# Patient Record
Sex: MALE | Race: OTHER | HISPANIC OR LATINO | URBAN - METROPOLITAN AREA
[De-identification: names, ages, dates, MRNs, and addresses within clinical notes are randomized per-mention and may not be internally consistent; named-entity substitution may affect disease eponyms.]

---

## 2024-11-07 ENCOUNTER — EMERGENCY (EMERGENCY)
Facility: HOSPITAL | Age: 24
LOS: 0 days | Discharge: HOME | End: 2024-11-07
Attending: EMERGENCY MEDICINE
Payer: SELF-PAY

## 2024-11-07 VITALS
TEMPERATURE: 99 F | DIASTOLIC BLOOD PRESSURE: 92 MMHG | OXYGEN SATURATION: 100 % | WEIGHT: 128.31 LBS | RESPIRATION RATE: 19 BRPM | SYSTOLIC BLOOD PRESSURE: 147 MMHG | HEART RATE: 92 BPM
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
## 2024-11-07 DIAGNOSIS — S60.351A SUPERFICIAL FOREIGN BODY OF RIGHT THUMB, INITIAL ENCOUNTER: ICD-10-CM
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
## 2024-11-07 PROCEDURE — 99284 EMERGENCY DEPT VISIT MOD MDM: CPT | Mod: 25

## 2024-11-07 PROCEDURE — 99283 EMERGENCY DEPT VISIT LOW MDM: CPT

## 2024-11-07 PROCEDURE — 64450 NJX AA&/STRD OTHER PN/BRANCH: CPT | Mod: RT

## 2024-11-07 RX ORDER — DOXYCYCLINE HYCLATE 100 MG/1
100 TABLET, FILM COATED ORAL ONCE
Refills: 0 | Status: DISCONTINUED | OUTPATIENT
Start: 2024-11-07 | End: 2024-11-07

## 2024-11-07 RX ORDER — CIPROFLOXACIN LACTATE 200MG/20ML
500 VIAL (ML) INTRAVENOUS ONCE
Refills: 0 | Status: COMPLETED | OUTPATIENT
Start: 2024-11-07 | End: 2024-11-07

## 2024-11-07 RX ORDER — CLOSTRIDIUM TETANI TOXOID ANTIGEN (FORMALDEHYDE INACTIVATED), CORYNEBACTERIUM DIPHTHERIAE TOXOID ANTIGEN (FORMALDEHYDE INACTIVATED), BORDETELLA PERTUSSIS TOXOID ANTIGEN (GLUTARALDEHYDE INACTIVATED), BORDETELLA PERTUSSIS FILAMENTOUS HEMAGGLUTININ ANTIGEN (FORMALDEHYDE INACTIVATED), BORDETELLA PERTUSSIS PERTACTIN ANTIGEN, AND BORDETELLA PERTUSSIS FIMBRIAE 2/3 ANTIGEN 5; 2; 2.5; 5; 3; 5 [LF]/.5ML; [LF]/.5ML; UG/.5ML; UG/.5ML; UG/.5ML; UG/.5ML
0.5 INJECTION, SUSPENSION INTRAMUSCULAR ONCE
Refills: 0 | Status: COMPLETED | OUTPATIENT
Start: 2024-11-07 | End: 2024-11-07

## 2024-11-07 RX ORDER — CEFPODOXIME PROXETIL 200 MG/1
1 TABLET, FILM COATED ORAL
Qty: 14 | Refills: 0
Start: 2024-11-07 | End: 2024-11-13

## 2024-11-07 RX ORDER — DOXYCYCLINE HYCLATE 100 MG/1
1 TABLET, FILM COATED ORAL
Qty: 14 | Refills: 0
Start: 2024-11-07 | End: 2024-11-13

## 2024-11-07 RX ADMIN — Medication 500 MILLIGRAM(S): at 15:27

## 2024-11-07 NOTE — ED PROCEDURE NOTE - GENERAL PROCEDURE DETAILS
After pt's thumb was cleaned with Betadine and anesthetized, I used an 18 gauge needle to follow fish hook into thumb and attempt to cover hook  / widen puncture wound.  Then hook was able to be pulled out of thumb without incident.  Puncture wound was copiously irrigated and dressed.

## 2024-11-07 NOTE — ED STATDOCS - PHYSICAL EXAMINATION
Constitutional: NAD AAOx3  Eyes: EOMI, pupils equal  Head: Normocephalic atraumatic  Mouth: no airway obstruction  Cardiac: regular rate   Resp: Lungs CTAB  GI: Abd s/nt/nd  Neuro: CN2-12 intact  Skin: No rashes, there is a fish hook in the hypothenar aspect of the right thumb

## 2024-11-07 NOTE — ED STATDOCS - PATIENT PORTAL LINK FT
You can access the FollowMyHealth Patient Portal offered by [facility] by registering at the following website: [email or web address]. By joining [facility]’s FollowMyHealth portal, you will also be able to view your health information using other applications (apps) compatible with our system.

## 2024-11-07 NOTE — ED ADULT NURSE NOTE - OBJECTIVE STATEMENT
Pt presents to the ED c/o fish hook in his right thumb. Pt was fishing when he tried to get the fish off of the jesus, When he pulled the fish up the hook went through the top pf his thumb. Pt denies any additional complaints at this time. Pt states he received tetanus shot 2 months ago. Tetanus shot ordered & offered in ED but pt refused. ANOx4, Amharic speaking, ambulatory with family at bedside.  Miguel #[id number] used for interview.

## 2024-11-07 NOTE — ED PROCEDURE NOTE - CPROC ED INFORMED CONSENT1
Benefits, risks, and possible complications of procedure explained to patient/caregiver who verbalized understanding and gave verbal consent. 28-Feb-2023 28-Feb-2023 11:08

## 2024-11-07 NOTE — ED STATDOCS - NSFOLLOWUPINSTRUCTIONS_ED_ALL_ED_FT
Extracción de un anzuelo  Fish Hook Removal  Los anzuelos tienen josse punta puntiaguda, también llamada púa, que dificulta pittman extracción. A veces, los anzuelos perforan accidentalmente la piel. Mel tipo de lesión ocurre con mayor frecuencia en la maricarmen, el cuero cabelludo, las orejas y la espalda. Con frecuencia, los anzuelos que perforan la piel deben ser retirados por un médico. El método de extracción depende del tipo y la ubicación de la lesión. Es posible que le den instrucciones específicas para el cuidado en el hogar si:  La lesión del anzuelo se produjo en el ming o cerca de él.  La lesión requirió puntos (suturas).  La lesión se infectó.  Si el anzuelo no fue retirado por un profesional médico, llame al médico. Quizás sea necesario que lo examinen o que le apliquen la vacuna antitetánica.    Siga estas instrucciones en pittman casa:  Medicamentos    Si le recetaron un antibiótico o un ungüento con antibiótico, tómelo o aplíqueselo moira se lo haya indicado el médico. No deje de usar el antibiótico aunque comience a sentirse mejor.  Use los medicamentos de venta lazaro y los recetados solamente moira se lo haya indicado el médico.  Bañarse    Mantenga el vendaje seco, moira se lo haya indicado el médico.  No tome ney de inmersión, no nade ni use el jacuzzi hasta que el médico lo autorice. Pregúntele al médico si puede ducharse.  Cuidado de la herida    A normal puncture site compared to an infected puncture site. The infected puncture site has swelling and redness.  Siga las instrucciones del médico acerca del cuidado del lugar de la herida por punción. Si tiene josse venda (vendaje), asegúrese de hacer lo siguiente:  Lávese las alyssia con agua y jabón seymour al menos 20 segundos antes y después de cambiarse el vendaje. Use desinfectante para alyssia si no dispone de agua y jabón.  Cambie el vendaje moira se lo haya indicado el médico.  No retire las suturas, la goma para cerrar la piel o las tiras adhesivas. Es posible que estos cierres cutáneos deban quedar puestos en la piel seymour 2 semanas o más tiempo. Si los bordes de las tiras adhesivas empiezan a despegarse y enroscarse, puede recortar los que estén sueltos. No retire las tiras adhesivas por completo a menos que el médico se lo indique.  Controle la linda de la punción todos los días para detectar signos de infección. Esté atento a los siguientes signos:  Enrojecimiento, hinchazón o dolor.  Líquido o fatuma.  Calor.  Pus o mal olor.  Instrucciones generales    Retome anabella actividades normales según lo indicado por el médico. Pregúntele al médico qué actividades son seguras para usted.  Concurra a todas las visitas de seguimiento. [place] es importante.  Comuníquese con un médico si:  Siente escalofríos o tiene fiebre.  Siente más dolor en el lugar de la punción.  Tiene cualquiera de estos signos de infección:  Enrojecimiento o hinchazón alrededor de la herida.  Líquido o fatuma que salen de la herida.  Calor que proviene de la herida.  Pus o mal olor proveniente de la herida.  Solicite ayuda de inmediato si:  Tiene signos de infección y le sale josse línea nick del lugar de la punción.  Resumen  Los anzuelos tienen josse punta puntiaguda, también llamada púa, que dificulta pittman extracción.  A veces, los anzuelos perforan accidentalmente la piel de la maricarmen, el cuero cabelludo, las orejas o la espalda.  Con frecuencia, los anzuelos que perforan la piel deben ser retirados por un médico.  Si el anzuelo no fue retirado por un profesional médico, llame al médico. Quizás sea necesario que lo examinen o que le apliquen la vacuna antitetánica.  Siga las instrucciones del médico acerca del cuidado del lugar de la herida por punción. Llame al médico si tiene cualquier signo de infección.  Esta información no tiene moira fin reemplazar el consejo del médico. Asegúrese de hacerle al médico cualquier pregunta que tenga.    Document Revised: 04/18/2022 Document Reviewed: 04/18/2022  [employer] Patient Education © 2024 [employer]  [employer] logo  Terms and Conditions  Privacy Policy  Editorial Policy  All content on this site: Copyright © 2024 [employer], its licensors, and contributors. All rights are reserved, including those for text and data mining, AI training, and similar technologies. For all open access content, the Creative Commons licensing terms apply.  Cookies are used b

## 2024-11-07 NOTE — ED STATDOCS - OBJECTIVE STATEMENT
23 year old male with no PMHx presenting to the ED s/p accidently getting fish hook stuck in right thumb. Pt was fishing in salt water. Pt denies numbness or tingling. Pt is unsure of last tetanus shot.  #[id number]